# Patient Record
Sex: MALE | Race: ASIAN | NOT HISPANIC OR LATINO | URBAN - METROPOLITAN AREA
[De-identification: names, ages, dates, MRNs, and addresses within clinical notes are randomized per-mention and may not be internally consistent; named-entity substitution may affect disease eponyms.]

---

## 2020-12-22 ENCOUNTER — EMERGENCY (EMERGENCY)
Facility: HOSPITAL | Age: 25
LOS: 1 days | Discharge: HOME | End: 2020-12-22
Attending: EMERGENCY MEDICINE | Admitting: EMERGENCY MEDICINE
Payer: MEDICAID

## 2020-12-22 VITALS
RESPIRATION RATE: 12 BRPM | OXYGEN SATURATION: 100 % | DIASTOLIC BLOOD PRESSURE: 79 MMHG | HEART RATE: 78 BPM | SYSTOLIC BLOOD PRESSURE: 132 MMHG | TEMPERATURE: 98 F

## 2020-12-22 PROCEDURE — 73030 X-RAY EXAM OF SHOULDER: CPT | Mod: 26,LT

## 2020-12-22 PROCEDURE — 73110 X-RAY EXAM OF WRIST: CPT | Mod: 26,RT

## 2020-12-22 PROCEDURE — 71250 CT THORAX DX C-: CPT | Mod: 26

## 2020-12-22 PROCEDURE — 99284 EMERGENCY DEPT VISIT MOD MDM: CPT

## 2020-12-22 RX ORDER — OXYCODONE AND ACETAMINOPHEN 5; 325 MG/1; MG/1
1 TABLET ORAL ONCE
Refills: 0 | Status: DISCONTINUED | OUTPATIENT
Start: 2020-12-22 | End: 2020-12-22

## 2020-12-22 RX ORDER — LIDOCAINE 4 G/100G
1 CREAM TOPICAL ONCE
Refills: 0 | Status: COMPLETED | OUTPATIENT
Start: 2020-12-22 | End: 2020-12-22

## 2020-12-22 RX ADMIN — OXYCODONE AND ACETAMINOPHEN 1 TABLET(S): 5; 325 TABLET ORAL at 18:15

## 2020-12-22 RX ADMIN — OXYCODONE AND ACETAMINOPHEN 1 TABLET(S): 5; 325 TABLET ORAL at 18:45

## 2020-12-22 RX ADMIN — LIDOCAINE 1 PATCH: 4 CREAM TOPICAL at 18:15
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
## 2020-12-22 NOTE — ED PROVIDER NOTE - RESPIRATORY, MLM
Breath sounds clear and equal bilaterally. Ecchymosis noted to left supraclavicular region extending to left chest and left trapezius. +Left trapezius tenderness.

## 2020-12-22 NOTE — ED PROVIDER NOTE - PATIENT PORTAL LINK FT
You can access the FollowMyHealth Patient Portal offered by [facility] by registering at the following website: [email or web address]. By joining [employer]’s FollowMyHealth portal, you will also be able to view your health information using other applications (apps) compatible with our system.

## 2020-12-22 NOTE — ED PROVIDER NOTE - ENMT, MLM
Airway patent, Nasal mucosa clear. Mouth with normal mucosa. Throat has no vesicles, no oropharyngeal exudates and uvula is midline. +left eyelid and under eye ecchymosis. +right eyelid ecchymosis. B/l lateral subconjunctival hemorrhage. Right TM preformation, no hemotympanum. Left TM grey. No machado sign.
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
## 2020-12-22 NOTE — ED PROVIDER NOTE - NSFOLLOWUPINSTRUCTIONS_ED_ALL_ED_FT
Follow up with Ortho within the week  Dr. Judi Barksdale  [phone]  Call to make appointment     Take Motrin 600mg 1 tab every 6-8 hrs as needed with food for pain.     Keep sling on until you see doctor. Rest arm.    Take Tylenol 650mg (Two 325 mg pills) every 4-6 hours as needed for pain.     Take Oxycodone 5mg once every 6 hours as needed for severe pain -- causes drowsiness; DO NOT drink alcohol, drive, or operate heavy machinery with this medication.     Worsening pain, numbness, weakness, swelling or new concerning symptoms return to the Emergency Department.
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
## 2020-12-22 NOTE — ED PROVIDER NOTE - MUSCULOSKELETAL, MLM
Spine appears normal, range of motion is not limited, no muscle or joint tenderness. Atraumatic. No spinal tenderness. No wrist or snuffbox tenderness. Atraumatic wrist/hand.

## 2020-12-28 ENCOUNTER — APPOINTMENT (OUTPATIENT)
Dept: ORTHOPEDIC SURGERY | Facility: CLINIC | Age: 25
End: 2020-12-28
Payer: MEDICAID

## 2020-12-28 VITALS — WEIGHT: 106 LBS | HEIGHT: 66 IN | BODY MASS INDEX: 17.04 KG/M2

## 2020-12-28 DIAGNOSIS — Z78.9 OTHER SPECIFIED HEALTH STATUS: ICD-10-CM

## 2020-12-28 PROBLEM — Z00.00 ENCOUNTER FOR PREVENTIVE HEALTH EXAMINATION: Status: ACTIVE | Noted: 2020-12-28

## 2020-12-28 PROCEDURE — 73000 X-RAY EXAM OF COLLAR BONE: CPT | Mod: LT

## 2020-12-28 PROCEDURE — 99204 OFFICE O/P NEW MOD 45 MIN: CPT

## 2020-12-28 RX ORDER — NAPROXEN 500 MG/1
500 TABLET ORAL
Qty: 60 | Refills: 0 | Status: ACTIVE | COMMUNITY
Start: 2020-12-21

## 2020-12-29 PROBLEM — Z78.9 OTHER SPECIFIED HEALTH STATUS: Chronic | Status: ACTIVE | Noted: 2020-12-22

## 2020-12-30 PROBLEM — Z78.9 NEVER EXERCISES: Status: ACTIVE | Noted: 2020-12-28

## 2020-12-30 PROBLEM — Z78.9 DOES NOT USE ILLICIT DRUGS: Status: ACTIVE | Noted: 2020-12-28

## 2020-12-30 PROBLEM — Z78.9 NO PERTINENT PAST MEDICAL HISTORY: Status: RESOLVED | Noted: 2020-12-28 | Resolved: 2020-12-30

## 2021-01-19 ENCOUNTER — APPOINTMENT (OUTPATIENT)
Dept: ORTHOPEDIC SURGERY | Facility: CLINIC | Age: 26
End: 2021-01-19
Payer: MEDICAID

## 2021-01-19 DIAGNOSIS — S42.022A DISPLACED FRACTURE OF SHAFT OF LEFT CLAVICLE, INITIAL ENCOUNTER FOR CLOSED FRACTURE: ICD-10-CM

## 2021-01-19 PROCEDURE — 73000 X-RAY EXAM OF COLLAR BONE: CPT | Mod: LT

## 2021-01-19 PROCEDURE — 99213 OFFICE O/P EST LOW 20 MIN: CPT

## 2021-01-19 PROCEDURE — 99072 ADDL SUPL MATRL&STAF TM PHE: CPT
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
## 2021-01-19 NOTE — PHYSICAL EXAM
[de-identified] : Constitutional: Well-nourished, well-developed, No acute distress\par Respiratory:  Good respiratory effort, no SOB\par Lymphatic: No regional lymphadenopathy, no lymphedema\par Psychiatric: Pleasant and normal affect, alert and oriented x3\par Skin: Clean dry and intact B/L UE/LE\par Musculoskeletal: normal except where as noted in regional exam\par \par Left shoulder:\par APPEARANCE: no marked deformities, no swelling or malalignment\par POSITIVE TENDERNESS: Minimal at distal clavicle\par NONTENDER: supraspinatus, infraspinatus, teres minor, LH biceps, anterior and posterior capsule, AC joint \par ROM: full & painless, no scapular winging or dyskinesia present\par RESISTIVE TESTING: painless 5/5 resisted flex/ext, empty can/ER/IR, horizontal abd/add \par SPECIAL TESTS: neg Drop Arm, neg Empty Can, neg Musa/Neers, neg Mota's, neg Speeds, neg Apprehension, neg cross arm adduction, neg apley's scratch test\par Vasc: 2+ radial pulse\par Neuro: AIN, PIN, Ulnar nerve intact to motor, DTRs 2+/4 biceps, triceps, brachioradialis\par Sensation: Intact to light touch throughout\par  [de-identified] : The following radiographs were ordered and read by me during this patient's visit. I reviewed each radiograph in detail with the patient and discussed the findings as highlighted below. \par \par 2 views of the left clavicle were obtained today that show a mildly displaced distal clavicle fracture.  There is no significant malalignment. There is no joint dislocation, or degenerative changes seen. No other obvious osseous abnormality. Otherwise unremarkable.

## 2021-01-19 NOTE — DISCUSSION/SUMMARY
[de-identified] : Patient was seen today for reevaluation of mildly displaced left distal clavicle fracture.  Today's x-ray does not show significant callus formation, however there is no increased displacement or other significant interval change from last x-ray 3 weeks ago.  At this time patient is suspected to have cartilaginous union of the fracture, and perhaps this will calcify over time.  Clinically the patient is doing very well, he has minimal to no pain, he has full range of motion, and full strength.  Based on clinical exam patient appears to have routine healing of his fracture, and he has already discontinued utilization of shoulder sling since 1 or 2 days after last visit.  At this time patient may continue with ADLs and activities as tolerated.  He is advised he may return to exercise activity with avoidance of weightbearing activity or impact activity with the left upper extremity for another 2 weeks.  Based on today's clinical exam there is no need for formal physical therapy, however if patient has any persisting pain for another 2-4 weeks would consider PT at that time.  Patient is currently out of work due to COVID-19 pandemic, he works at a [employer], he is advised that he should likely be able to return to full work duty as soon as social events are permitted to return and he is able to return to work.  Follow up as needed.  Patient appreciates and agrees with current plan.\par \par This note was generated using dragon medical dictation software.  A reasonable effort has been made for proofreading its contents, but typos may still remain.  If there are any questions or points of clarification needed please notify my office.

## 2021-08-17 ENCOUNTER — APPOINTMENT (OUTPATIENT)
Dept: ORTHOPEDIC SURGERY | Facility: CLINIC | Age: 26
End: 2021-08-17
Payer: MEDICAID

## 2021-08-17 DIAGNOSIS — S42.022D DISPLACED FRACTURE OF SHAFT OF LEFT CLAVICLE, SUBSEQUENT ENCOUNTER FOR FRACTURE WITH ROUTINE HEALING: ICD-10-CM

## 2021-08-17 PROCEDURE — 99213 OFFICE O/P EST LOW 20 MIN: CPT

## 2021-08-17 NOTE — DISCUSSION/SUMMARY
[de-identified] : Patient was seen today for reevaluation of left distal clavicle fracture.  At this time patient has full range of motion, full strength, no pain with palpation on clinical exam.  As per the patient's history he can do upwards of 30 push-ups without difficulty.  He will very rarely have slight discomfort in the shoulder region, but none of it is long-lasting or reproducible.  Patient is advised he has routine healing of his fracture, he has full range of motion and full strength.  He is cleared for full work activity without restrictions, and can return to weightlifting and other recreational activities without restrictions as well.  Follow up as needed.  Patient appreciates and agrees with current plan.\par \par This note was generated using dragon medical dictation software.  A reasonable effort has been made for proofreading its contents, but typos may still remain.  If there are any questions or points of clarification needed please notify my office.\par

## 2021-08-17 NOTE — HISTORY OF PRESENT ILLNESS
[de-identified] : Patient is here for left shoulder follow up. He states that since his last appointment he has noticed improvement. He has intermittent pain when sleeping on that side and with certain movements. He is scheduled to return to work next month and wants to start back at the gym but wanted reassurance prior to starting. There has been no recent injury.

## 2021-08-17 NOTE — PHYSICAL EXAM
[de-identified] : Constitutional: Well-nourished, well-developed, No acute distress\par Respiratory:  Good respiratory effort, no SOB\par Lymphatic: No regional lymphadenopathy, no lymphedema\par Psychiatric: Pleasant and normal affect, alert and oriented x3\par Musculoskeletal: normal except where as noted in regional exam\par \par Left shoulder:\par APPEARANCE: no marked deformities, no swelling or malalignment\par POSITIVE TENDERNESS: none\par NONTENDER: supraspinatus, infraspinatus, teres minor, LH biceps, anterior and posterior capsule, AC joint \par ROM: full & painless, no scapular winging or dyskinesia present\par RESISTIVE TESTING: painless 5/5 resisted flex/ext, empty can/ER/IR, horizontal abd/add \par SPECIAL TESTS: neg Drop Arm, neg Empty Can, neg Musa/Neers, neg Mota's, neg Speeds, neg Apprehension, neg cross arm adduction, neg apley's scratch test\par